# Patient Record
Sex: FEMALE | URBAN - METROPOLITAN AREA
[De-identification: names, ages, dates, MRNs, and addresses within clinical notes are randomized per-mention and may not be internally consistent; named-entity substitution may affect disease eponyms.]

---

## 2020-08-24 ENCOUNTER — TELEPHONE (OUTPATIENT)
Dept: FAMILY MEDICINE CLINIC | Age: 70
End: 2020-08-24

## 2020-08-24 NOTE — TELEPHONE ENCOUNTER
Called and she will call back later to for the SEPT schedule regarding Dr. Maye Garcia virtual telephone appointment.

## 2020-08-24 NOTE — TELEPHONE ENCOUNTER
----- Message from Michelle Orantes sent at 8/21/2020  4:27 PM EDT -----  Regarding: Dr. Aldo Carrasco    Reason: The patient would like to schedule an in-person visit in regard to being seen for a new patient visit. The patient prefers a female doctor.      Phone: [phone] daughter in law, Karyle Raker